# Patient Record
Sex: FEMALE | Race: BLACK OR AFRICAN AMERICAN | NOT HISPANIC OR LATINO | URBAN - METROPOLITAN AREA
[De-identification: names, ages, dates, MRNs, and addresses within clinical notes are randomized per-mention and may not be internally consistent; named-entity substitution may affect disease eponyms.]

---

## 2022-11-04 ENCOUNTER — TELEPHONE (OUTPATIENT)
Dept: OBSTETRICS AND GYNECOLOGY | Facility: CLINIC | Age: 27
End: 2022-11-04
Payer: MEDICAID

## 2022-11-04 NOTE — TELEPHONE ENCOUNTER
----- Message from Shan Goodwin sent at 11/4/2022 10:35 AM CDT -----  Contact: Patient  Patient want to schedule with a female.  Jordyn saw her with her other pregnancies.    Please call patient to schedule her first pregnancy confirmation appointment      #  [phone]  Pt SUKI U/S is schedule on 11/9/22 at 1:30. Pt is aware and voices understanding.  Ame

## 2022-11-08 DIAGNOSIS — Z34.01 ENCOUNTER FOR SUPERVISION OF NORMAL FIRST PREGNANCY IN FIRST TRIMESTER: Primary | ICD-10-CM

## 2022-11-09 ENCOUNTER — PROCEDURE VISIT (OUTPATIENT)
Dept: OBSTETRICS AND GYNECOLOGY | Facility: CLINIC | Age: 27
End: 2022-11-09
Payer: MEDICAID

## 2022-11-09 DIAGNOSIS — Z34.01 ENCOUNTER FOR SUPERVISION OF NORMAL FIRST PREGNANCY IN FIRST TRIMESTER: ICD-10-CM

## 2022-11-09 PROCEDURE — 76801 OB US < 14 WKS SINGLE FETUS: CPT | Mod: S$GLB,,, | Performed by: STUDENT IN AN ORGANIZED HEALTH CARE EDUCATION/TRAINING PROGRAM

## 2022-11-09 PROCEDURE — 76801 US OB/GYN PROCEDURE (VIEWPOINT): ICD-10-PCS | Mod: S$GLB,,, | Performed by: STUDENT IN AN ORGANIZED HEALTH CARE EDUCATION/TRAINING PROGRAM

## 2022-11-18 ENCOUNTER — ROUTINE PRENATAL (OUTPATIENT)
Dept: OBSTETRICS AND GYNECOLOGY | Facility: CLINIC | Age: 27
End: 2022-11-18
Payer: MEDICAID

## 2022-11-18 VITALS — WEIGHT: 103 LBS | SYSTOLIC BLOOD PRESSURE: 110 MMHG | HEART RATE: 93 BPM | DIASTOLIC BLOOD PRESSURE: 78 MMHG

## 2022-11-18 DIAGNOSIS — Z12.4 CERVICAL CANCER SCREENING: ICD-10-CM

## 2022-11-18 DIAGNOSIS — Z98.891 H/O: C-SECTION: ICD-10-CM

## 2022-11-18 DIAGNOSIS — Z11.3 SCREEN FOR STD (SEXUALLY TRANSMITTED DISEASE): ICD-10-CM

## 2022-11-18 DIAGNOSIS — Z3A.12 12 WEEKS GESTATION OF PREGNANCY: ICD-10-CM

## 2022-11-18 DIAGNOSIS — Z34.81 ENCOUNTER FOR SUPERVISION OF OTHER NORMAL PREGNANCY IN FIRST TRIMESTER: Primary | ICD-10-CM

## 2022-11-18 PROBLEM — Z34.91 ENCOUNTER FOR SUPERVISION OF NORMAL PREGNANCY IN FIRST TRIMESTER: Status: ACTIVE | Noted: 2022-11-18

## 2022-11-18 PROCEDURE — 99203 PR OFFICE/OUTPT VISIT, NEW, LEVL III, 30-44 MIN: ICD-10-PCS | Mod: TH,S$GLB,, | Performed by: STUDENT IN AN ORGANIZED HEALTH CARE EDUCATION/TRAINING PROGRAM

## 2022-11-18 PROCEDURE — 99203 OFFICE O/P NEW LOW 30 MIN: CPT | Mod: TH,S$GLB,, | Performed by: STUDENT IN AN ORGANIZED HEALTH CARE EDUCATION/TRAINING PROGRAM

## 2022-11-18 NOTE — PROGRESS NOTES
CC: Initial OB visit    HPI:  26 y.o. at 12w3d with EDC of 2023, by 11w Ultrasound.  Complains of mild nausea and round ligament pain. She has no other complaints today,.    ROS:  Negative unless mentioned above    PMH: anxiety, depression  PSH: none  Meds: none  ALL: morphine - itching  OB Hx: :    G2: C/s 2/2 infection   G3: current  SOC: denies S/E/D   FH: denies family history of congenital defects, mental retardation, twins, cystic fibrosis, Down's syndrome, sickle cell, NTD's, cleft lip/palate, cardiac defects, abdominal wall defects, GYN cancers   GYN Hx: no past history STD's,  Negative History of HSV,  Negative History of Abnormal PAP    PHYSICAL EXAM  Prenatal Vitals  BP: 110/78  Weight: 46.7 kg (103 lb)    There is no height or weight on file to calculate BMI.    Wt Readings from Last 3 Encounters:   22 46.7 kg (103 lb)     General: NAD, well developed, well nourished  Psych: alert and oriented to person, time and place, normal affect  HEENT: normocephalic, atraumatic  Gravid, soft, NT  Skin: warm, dry  Neuro: normal gait, gross motor function intact  Pelvic: NEFG. Normal vaginal mucosa, pink/ruggated, no lesions or discharge. Cvx closed, nonfriable  No cyanosis, clubbing or edema    ASSESSMENT: Patient is a 26 y.o.  at 12w3d with  Patient Active Problem List   Diagnosis    H/O:     Encounter for supervision of normal pregnancy in first trimester     PLAN:  NIPT today, AFP on RTC  PNL, GC/CZ, PAP today  PNV, Iron  Pain, Fever, Bleeding Precautions  JANE, LESVIA, PreE precautions  Encouraged Breast feeding  F/U in 4 weeks

## 2022-11-21 ENCOUNTER — TELEPHONE (OUTPATIENT)
Dept: OBSTETRICS AND GYNECOLOGY | Facility: CLINIC | Age: 27
End: 2022-11-21
Payer: MEDICAID

## 2022-11-21 PROBLEM — A59.9 TRICHOMONAS VAGINALIS INFECTION: Status: ACTIVE | Noted: 2022-11-21

## 2022-11-21 LAB
CHLAMYDIA: NEGATIVE
GONORRHEA: NEGATIVE
SOURCE: ABNORMAL
SOURCE: NORMAL
TRICHOMONAS AMPLIFIED: POSITIVE

## 2022-11-21 NOTE — TELEPHONE ENCOUNTER
Pt notified of lab results and medication being called to pharmacy.     ----- Message from Garcia Munguia MD sent at 11/21/2022  4:25 PM CST -----  Please call patient and notify of +Trichomonas. Patient may be treated in clinic with Flagyl 2 grams PO x 1 or a prescription can be called in for Flagyl 500 mg, 4 tablets PO x 1, Disp: #4, No refills, Diagnosis code: A59.00.    Patient's partner needs to be tested and treated as well. No intercourse x 2 weeks after both patient and partner treated.

## 2022-11-21 NOTE — TELEPHONE ENCOUNTER
Called pt no answer left pt vm       ----- Message from Alison Yanez sent at 11/21/2022 12:27 PM CST -----  Contact: self  Pt called and asked if she can get a call back regarding a referral. Pt can be reached at [phone]

## 2022-11-28 LAB — Lab: NORMAL

## 2023-01-10 ENCOUNTER — PATIENT MESSAGE (OUTPATIENT)
Dept: OTHER | Facility: OTHER | Age: 28
End: 2023-01-10
Payer: MEDICAID

## 2023-01-11 ENCOUNTER — ROUTINE PRENATAL (OUTPATIENT)
Dept: OBSTETRICS AND GYNECOLOGY | Facility: CLINIC | Age: 28
End: 2023-01-11
Payer: MEDICAID

## 2023-01-11 VITALS
BODY MASS INDEX: 15.73 KG/M2 | SYSTOLIC BLOOD PRESSURE: 104 MMHG | DIASTOLIC BLOOD PRESSURE: 70 MMHG | HEIGHT: 67 IN | WEIGHT: 100.25 LBS | HEART RATE: 71 BPM

## 2023-01-11 DIAGNOSIS — Z36.89 ENCOUNTER FOR FETAL ANATOMIC SURVEY: ICD-10-CM

## 2023-01-11 DIAGNOSIS — O21.9 NAUSEA AND VOMITING IN PREGNANCY: ICD-10-CM

## 2023-01-11 DIAGNOSIS — Z34.82 ENCOUNTER FOR SUPERVISION OF NORMAL PREGNANCY IN MULTIGRAVIDA IN SECOND TRIMESTER: Primary | ICD-10-CM

## 2023-01-11 DIAGNOSIS — Z98.891 H/O: C-SECTION: ICD-10-CM

## 2023-01-11 PROCEDURE — 99213 OFFICE O/P EST LOW 20 MIN: CPT | Mod: TH,S$GLB,, | Performed by: NURSE PRACTITIONER

## 2023-01-11 PROCEDURE — 99213 PR OFFICE/OUTPT VISIT, EST, LEVL III, 20-29 MIN: ICD-10-PCS | Mod: TH,S$GLB,, | Performed by: NURSE PRACTITIONER

## 2023-01-11 RX ORDER — ONDANSETRON 4 MG/1
4 TABLET, ORALLY DISINTEGRATING ORAL EVERY 6 HOURS PRN
Qty: 30 TABLET | Refills: 1 | Status: SHIPPED | OUTPATIENT
Start: 2023-01-11 | End: 2023-01-16 | Stop reason: SDUPTHER

## 2023-01-11 RX ORDER — ASCORBIC ACID, CHOLECALCIFEROL, .ALPHA.-TOCOPHEROL ACETATE, DL-, PYRIDOXINE HYDROCHLORIDE, FOLIC ACID, CYANOCOBALAMIN, BIOTIN, CALCIUM CARBONATE, FERROUS ASPARTO GLYCINATE, IRON, POTASSIUM IODIDE, MAGNESIUM OXIDE, DOCONEXENT AND LOWBUSH BLUEBERRY 60; 1000; 10; 26; 400; 13; 280; 80; 9; 9; 150; 25; 350; 25; 600 MG/1; [IU]/1; [IU]/1; MG/1; UG/1; UG/1; UG/1; MG/1; MG/1; MG/1; UG/1; MG/1; MG/1; MG/1; UG/1
1 CAPSULE, GELATIN COATED ORAL DAILY
Qty: 30 CAPSULE | Refills: 10 | Status: SHIPPED | OUTPATIENT
Start: 2023-01-11

## 2023-01-11 NOTE — PROGRESS NOTES
Subjective:       Patient ID: Melly Yang is a 27 y.o.  at 20w1d      Chief Complaint:  Routine Prenatal Visit, Abdominal Pain, and Morning Sickness      History of Present Illness  Presents for routine prenatal visit.  Complains of round ligament pain and nausea and vomiting.   history of normal vaginal delivery x1 in primary  x1 related to fetal intolerance both pregnancies uncomplicated.  Patient has not gotten prenatal labs drawn yet.  Also noted to have Trichomonas but has had transportation issues and has not taken Flagyl. Labs and history reviewed with pt.       Review of Systems  Denies n/v, f/c, dysuria, contractions,   VD, VB, headaches, + round ligament pain,   + N/V     OB History          3    Para   2    Term   2            AB        Living   2         SAB        IAB        Ectopic        Multiple        Live Births   2                   Objective:     Vitals:    23 0902   BP: 104/70   Pulse: 71     Wt Readings from Last 3 Encounters:   23 45.5 kg (100 lb 4 oz)   22 46.7 kg (103 lb)        nad  NCAT  pupils normal size  Skin nml no rashes or lesions  No resp distress, resp even and unlabored  Gravid nt, no rebound no guarding  No cyanosis or clubbing, edema appropriate for pregn  FH AGA  FHT: 150s       Assessment:        1. Encounter for supervision of normal pregnancy in multigravida in second trimester    2. Encounter for fetal anatomic survey    3. Nausea and vomiting in pregnancy    4. H/O:                 Plan:        Encounter for supervision of normal pregnancy in multigravida in second trimester  -     PREQUEL® Prenatal Screen (NIPS); Future; Expected date: 2023  -     prenatal vit 87-iron-folic-dha (PRENATE MINI, FERR ASP GLYCIN,) 18-1-350 mg Cap; Take 1 capsule by mouth once daily.  Dispense: 30 capsule; Refill: 10  -     Urinalysis, Reflex to Urine Culture Urine, Clean Catch  -     Non-DOT Drug Screen 8 Panel,  Urine    Encounter for fetal anatomic survey  -      OB/GYN Procedure ([facility]) - Extended List; Future    Nausea and vomiting in pregnancy  -     ondansetron (ZOFRAN-ODT) 4 MG TbDL; Take 1 tablet (4 mg total) by mouth every 6 (six) hours as needed (nausea).  Dispense: 30 tablet; Refill: 1    H/O:          Small frequent meals bland diet and Zofran as needed for nausea  Needs prenatal labs  Declines flu vaccine  Encouraged PNV  Pain, fever, bleeding precautions   Follow up in about 4 weeks (around 2023).

## 2023-01-12 LAB
AMORPH URATE CRY URNS QL MICRO: NEGATIVE
BACTERIA #/AREA URNS HPF: ABNORMAL /[HPF]
BILIRUB UR QL STRIP: NEGATIVE
CLARITY UR: ABNORMAL
COLOR UR: YELLOW
EPITHELIAL CELLS: ABNORMAL
GLUCOSE (UA): NEGATIVE MG/DL
KETONES UR QL STRIP: NEGATIVE MG/DL
LEUKOCYTE ESTERASE UR QL STRIP: ABNORMAL
MUCOUS THREADS URNS QL MICRO: ABNORMAL
NITRITE UR QL STRIP: NEGATIVE
OCCULT BLOOD: NEGATIVE
PH, URINE: 6.5 (ref 5–7.5)
PROT UR QL STRIP: 25 MG/DL
RBC/HPF: NEGATIVE
SP GR UR STRIP: 1.01 (ref 1–1.03)
URINE CULTURE, ROUTINE: NORMAL
UROBILINOGEN, URINE: 1 E.U./DL (ref 0–1)
WBC/HPF: ABNORMAL

## 2023-01-17 ENCOUNTER — PATIENT MESSAGE (OUTPATIENT)
Dept: OBSTETRICS AND GYNECOLOGY | Facility: CLINIC | Age: 28
End: 2023-01-17
Payer: MEDICAID

## 2023-01-24 DIAGNOSIS — Z34.82 ENCOUNTER FOR SUPERVISION OF NORMAL PREGNANCY IN MULTIGRAVIDA IN SECOND TRIMESTER: Primary | ICD-10-CM

## 2023-01-25 ENCOUNTER — PROCEDURE VISIT (OUTPATIENT)
Dept: OBSTETRICS AND GYNECOLOGY | Facility: CLINIC | Age: 28
End: 2023-01-25
Payer: MEDICAID

## 2023-01-25 DIAGNOSIS — Z34.82 ENCOUNTER FOR SUPERVISION OF NORMAL PREGNANCY IN MULTIGRAVIDA IN SECOND TRIMESTER: ICD-10-CM

## 2023-01-25 PROCEDURE — 76805 US OB/GYN PROCEDURE (VIEWPOINT): ICD-10-PCS | Mod: S$GLB,,, | Performed by: STUDENT IN AN ORGANIZED HEALTH CARE EDUCATION/TRAINING PROGRAM

## 2023-01-25 PROCEDURE — 76805 OB US >/= 14 WKS SNGL FETUS: CPT | Mod: S$GLB,,, | Performed by: STUDENT IN AN ORGANIZED HEALTH CARE EDUCATION/TRAINING PROGRAM

## 2023-02-09 ENCOUNTER — TELEPHONE (OUTPATIENT)
Dept: OBSTETRICS AND GYNECOLOGY | Facility: CLINIC | Age: 28
End: 2023-02-09

## 2023-02-09 NOTE — TELEPHONE ENCOUNTER
"Returned call and pt wanted to be seen next week. I let her know that  is out next week. Pt asked when was a sooner appt and I stated that Feb. 23 was the soonest that she could be seen. Pt then said "I am switching doctors because y'all don't give a fuck," and proceeded to hang up in my face.   ----- Message from Violeta Rockwell sent at 2/9/2023 12:39 PM CST -----  Type:  Sooner Apoointment Request    Caller is requesting a sooner appointment.  Caller declined first available appointment listed below.  Caller will not accept being placed on the waitlist and is requesting a message be sent to doctor.  Name of Caller: Kunalevelyn Mayroberto  When is the first available appointment? 2/23  Symptoms:OB FU  Would the patient rather a call back or a response via [facility]? Call back  Best Call Back Number: [phone]  Additional Information:         "

## 2023-02-13 LAB
ANTIBODY SCREEN: NEGATIVE
BLOOD GROUPING: NORMAL
BLOOD TYPE (D): POSITIVE
HBV SURFACE AG SERPL QL IA: NONREACTIVE
HCV IGG SERPL QL IA: NONREACTIVE
HIV 1+2 AB+HIV1 P24 AG SERPL QL IA: NONREACTIVE
RUBELLA IGG SCREEN: NORMAL
SICKLE CELL PREP: NEGATIVE
SYPHILIS TREPONEMAL ANTIBODY: NONREACTIVE

## 2023-03-14 ENCOUNTER — TELEPHONE (OUTPATIENT)
Dept: OBSTETRICS AND GYNECOLOGY | Facility: CLINIC | Age: 28
End: 2023-03-14
Payer: MEDICAID

## 2023-03-14 NOTE — TELEPHONE ENCOUNTER
--    Spoke to pt's mother and Melly Laloroberto found another doctor for her care.          --- Message from Ramya Jernigan sent at 3/14/2023 12:52 PM CDT -----  Regarding: appointment  Contact: Cassidy  Per phone call with PT Mom Cassidy, the patient is needing to be scheduled to be seen and she is due in May she has missed some appts and I was unsure of which provider she sees, she needs a check up, return call [phone]